# Patient Record
Sex: FEMALE | Race: WHITE | NOT HISPANIC OR LATINO | Employment: FULL TIME | URBAN - NONMETROPOLITAN AREA
[De-identification: names, ages, dates, MRNs, and addresses within clinical notes are randomized per-mention and may not be internally consistent; named-entity substitution may affect disease eponyms.]

---

## 2018-08-20 ENCOUNTER — HOSPITAL ENCOUNTER (OUTPATIENT)
Dept: ULTRASOUND IMAGING | Facility: HOSPITAL | Age: 38
Discharge: HOME | End: 2018-08-20
Admitting: NURSE PRACTITIONER

## 2018-08-20 ENCOUNTER — TRANSCRIBE ORDERS (OUTPATIENT)
Dept: ADMINISTRATIVE | Facility: HOSPITAL | Age: 38
End: 2018-08-20

## 2018-08-20 DIAGNOSIS — IMO0002 LUMP: Primary | ICD-10-CM

## 2018-08-20 DIAGNOSIS — IMO0002 LUMP: ICD-10-CM

## 2018-08-20 PROCEDURE — 76882 US LMTD JT/FCL EVL NVASC XTR: CPT

## 2025-07-14 ENCOUNTER — RESULTS FOLLOW-UP (OUTPATIENT)
Dept: OBGYN CLINIC | Age: 45
End: 2025-07-14

## 2025-07-14 ENCOUNTER — OFFICE VISIT (OUTPATIENT)
Dept: OBGYN CLINIC | Age: 45
End: 2025-07-14
Payer: COMMERCIAL

## 2025-07-14 VITALS — WEIGHT: 158 LBS | DIASTOLIC BLOOD PRESSURE: 84 MMHG | SYSTOLIC BLOOD PRESSURE: 133 MMHG | HEART RATE: 84 BPM

## 2025-07-14 DIAGNOSIS — N95.1 SYMPTOMATIC MENOPAUSAL OR FEMALE CLIMACTERIC STATES: ICD-10-CM

## 2025-07-14 DIAGNOSIS — Z01.419 WELL WOMAN EXAM WITH ROUTINE GYNECOLOGICAL EXAM: ICD-10-CM

## 2025-07-14 DIAGNOSIS — Z76.89 ENCOUNTER TO ESTABLISH CARE: Primary | ICD-10-CM

## 2025-07-14 DIAGNOSIS — Z12.4 SCREENING FOR CERVICAL CANCER: ICD-10-CM

## 2025-07-14 DIAGNOSIS — Z12.39 ENCOUNTER FOR SCREENING BREAST EXAMINATION: ICD-10-CM

## 2025-07-14 DIAGNOSIS — R63.5 ABNORMAL WEIGHT GAIN: ICD-10-CM

## 2025-07-14 DIAGNOSIS — Z12.31 ENCOUNTER FOR SCREENING MAMMOGRAM FOR MALIGNANT NEOPLASM OF BREAST: ICD-10-CM

## 2025-07-14 DIAGNOSIS — N39.46 MIXED INCONTINENCE: ICD-10-CM

## 2025-07-14 LAB
CORTIS SERPL-MCNC: 5.1 UG/DL
ESTRADIOL SERPL-SCNC: 166 PG/ML
FSH SERPL-SCNC: 2.7 MIU/ML
PROGEST SERPL-MCNC: 8.77 NG/ML
TESTOST SERPL-MCNC: 20 NG/DL (ref 8.4–48.1)

## 2025-07-14 PROCEDURE — 99396 PREV VISIT EST AGE 40-64: CPT | Performed by: NURSE PRACTITIONER

## 2025-07-14 PROCEDURE — 99213 OFFICE O/P EST LOW 20 MIN: CPT | Performed by: NURSE PRACTITIONER

## 2025-07-14 RX ORDER — ZOLPIDEM TARTRATE 10 MG/1
TABLET ORAL NIGHTLY PRN
COMMUNITY

## 2025-07-14 RX ORDER — ROPINIROLE 2 MG/1
2 TABLET, FILM COATED ORAL 3 TIMES DAILY
COMMUNITY

## 2025-07-14 RX ORDER — ESTRADIOL 10 UG/1
TABLET, FILM COATED VAGINAL
Qty: 13 TABLET | Refills: 1 | Status: SHIPPED | OUTPATIENT
Start: 2025-07-14

## 2025-07-14 ASSESSMENT — ENCOUNTER SYMPTOMS
EYES NEGATIVE: 1
GASTROINTESTINAL NEGATIVE: 1
RESPIRATORY NEGATIVE: 1
CONSTIPATION: 0
ALLERGIC/IMMUNOLOGIC NEGATIVE: 1
DIARRHEA: 0

## 2025-07-14 NOTE — PROGRESS NOTES
Pt presents today for pap smear and breast exam.      Mammo:NA   Pap smear:2019  Contraception:NA     P:2  Ab:0  Bone density:NA   Colonoscopy:NA

## 2025-07-14 NOTE — PROGRESS NOTES
Alycia Andrade is a 44 y.o. female who presents today for her medical conditions/ complaints as noted below. Alycia Andrade is c/o of Establish Care and Annual Exam        HPI  New pt presents to establish care. Due for well woman exam and pap smear. Has never had screening mammogram. Dr Sahni PCP- draws labs and manages meds.     Was around 13 years old when she started menses. Periods have been heavy, painful and irregular for the past few months. Has noticed about a 30lb weight gain in the past 9 months. Having difficulty sleeping, hot flashes and night sweats. Feels like \"things have fallen\" in the past few months and having more bladder incontinence- stress and urge.     Long history of breast pain- implants x2 times and reports encapsulation and chronic pain. Nervous about getting a mammogram- unsure if she will be able to have it done.     Unsure if her symptoms could be related to perimenopause. PCP just checked annual wellness labs, but not hormones. Interested in further testing and treatment options.     Mammo:NA   Pap smear:2019  Contraception:NA     P:2  Ab:0  Bone density:NA   Colonoscopy:NA  Patient's last menstrual period was 2025 (exact date).      History reviewed. No pertinent past medical history.  Past Surgical History:   Procedure Laterality Date    BREAST ENHANCEMENT SURGERY      x's 2     Family History   Problem Relation Age of Onset    Hypertension Mother     Hypertension Father      Social History     Tobacco Use    Smoking status: Former     Types: Cigarettes     Start date:     Smokeless tobacco: Not on file   Substance Use Topics    Alcohol use: Yes     Comment: occ       Current Outpatient Medications   Medication Sig Dispense Refill    rOPINIRole (REQUIP) 2 MG tablet Take 1 tablet by mouth 3 times daily      zolpidem (AMBIEN) 10 MG tablet Take by mouth nightly as needed for Sleep.      Estradiol (VAGIFEM) 10 MCG TABS vaginal tablet Insert vaginally nightly x7 nights, then

## 2025-07-17 LAB
DHEA-S SERPL-MCNC: 152 UG/DL (ref 35–256)
HPV HR 12 DNA SPEC QL NAA+PROBE: NOT DETECTED
HPV16 DNA SPEC QL NAA+PROBE: NOT DETECTED
HPV16+18+H RISK 12 DNA SPEC-IMP: NORMAL
HPV18 DNA SPEC QL NAA+PROBE: NOT DETECTED